# Patient Record
Sex: MALE | URBAN - METROPOLITAN AREA
[De-identification: names, ages, dates, MRNs, and addresses within clinical notes are randomized per-mention and may not be internally consistent; named-entity substitution may affect disease eponyms.]

---

## 2020-03-24 ENCOUNTER — TELEPHONE (OUTPATIENT)
Dept: DERMATOLOGY | Facility: CLINIC | Age: 25
End: 2020-03-24

## 2020-03-24 NOTE — TELEPHONE ENCOUNTER
Called and spoke with patient. He does not have any areas of concern right now. He stated he recently moved to the area and was looking for a general skin check. Reschedule for the end of June.

## 2020-06-24 ENCOUNTER — TELEPHONE (OUTPATIENT)
Dept: DERMATOLOGY | Facility: CLINIC | Age: 25
End: 2020-06-24

## 2020-06-24 NOTE — TELEPHONE ENCOUNTER
Left voicemail if patients wants to reschedule upcoming visit,or  be seen in person, or do a virtual visit. Clinic number provided for call back.    Bibi Colon LPN

## 2020-06-30 ENCOUNTER — OFFICE VISIT (OUTPATIENT)
Dept: DERMATOLOGY | Facility: CLINIC | Age: 25
End: 2020-06-30
Payer: COMMERCIAL

## 2020-06-30 DIAGNOSIS — L70.0 ACNE VULGARIS: Primary | ICD-10-CM

## 2020-06-30 DIAGNOSIS — D22.9 MULTIPLE BENIGN NEVI: ICD-10-CM

## 2020-06-30 RX ORDER — TRETINOIN 0.25 MG/G
CREAM TOPICAL
Qty: 45 G | Refills: 11 | Status: SHIPPED | OUTPATIENT
Start: 2020-06-30

## 2020-06-30 RX ORDER — BENZOYL PEROXIDE 60 MG/1
CLOTH TOPICAL
Qty: 60 EACH | Refills: 11 | Status: SHIPPED | OUTPATIENT
Start: 2020-06-30

## 2020-06-30 RX ORDER — BENZOYL PEROXIDE 60 MG/1
CLOTH TOPICAL 2 TIMES DAILY
COMMUNITY
Start: 2020-02-24 | End: 2020-06-30

## 2020-06-30 RX ORDER — CLINDAMYCIN PHOSPHATE 10 UG/ML
LOTION TOPICAL DAILY
COMMUNITY
Start: 2020-06-20

## 2020-06-30 ASSESSMENT — PAIN SCALES - GENERAL: PAINLEVEL: NO PAIN (0)

## 2020-06-30 NOTE — LETTER
Date:July 1, 2020      Patient was self referred, no letter generated. Do not send.        [facility] Information

## 2020-06-30 NOTE — LETTER
"6/30/2020       RE: Vic Yoon  [address] [place] [id number]     Dear Colleague,    Thank you for referring your patient, Vic Yoon, to the [facility] DERMATOLOGY at [facility]. Please see a copy of my visit note below.    [facility] Dermatology Note      Dermatology Problem List:  1.Numerous benign nevi  2. Acne vulgaris    Encounter Date: Jun 30, 2020    CC:   Chief Complaint   Patient presents with     Derm Problem     Vic is here today for a skin check. He states \" my acne is doing ok and I have bump under my ear that concerns me\"         History of Present Illness:  Mr. Vic Yoon is a 25 year old male who presents for evaluation of skin check.   Patient states he has had a mole on head and back biopsied in the past. Says he has lots of moles that he has been followed closely for by dermatologist back in [place]. No personal or family hx of MM or other skin cancers. Has had some bad sun burns in past though no tanning bed use. No changing moles he is aware of.  Patient has history of acne for which he has been using clinda lotion and BPO wipes. He has previously used oral antibiotics, though ran out in April. He feels ABx help, but feels things are alright without ABx as well. Does not think he has ever used retinoids in the past.  Pt otherwise feels well without any changes in general state of health. Denies any new, growing, changing, bleeding, or otherwise concerning/symptomatic skin findings. No other questions or concerns today.       Past Medical History:   There is no problem list on file for this patient.    No past medical history on file.  No past surgical history on file.    Social History:  Patient reports that he has never smoked. He has never used smokeless tobacco.    Family History:  No family history on file.    Medications:  Current Outpatient Medications   Medication Sig Dispense Refill     BENZEPRO FOAMING CLOTHS 6 % MISC 2 times daily  "      clindamycin (CLEOCIN T) 1 % external lotion daily          No Known Allergies      Review of Systems:  -As per HPI  -Constitutional: Otherwise feeling well today, in usual state of health.  -HEENT: Patient denies nonhealing oral sores.  -Skin: As above in HPI. No additional skin concerns.    Physical exam:  Vitals: There were no vitals taken for this visit.  GEN: This is a well developed, well-nourished male in no acute distress, in a pleasant mood.    SKIN: Total skin excluding the undergarment areas but including the buttocks was performed. The exam included the head/face, neck, both arms, chest, back, abdomen, both legs, digits and/or nails.   -Hernandez skin type: II  -There are superifical acneiform papules with intermixed open and closed comedones on the chin primarily, few scattered areas elsewhere on face.  -Innumerable light to dark brown homogenous, regular appearing macules distributed throughout body - including face, chest, abd, back, extremities; none with concerning findings on dermoscopy and all fit same pattern - no outliers; right upper shoulder with slightly more asymmetric lesion that should be monitored   -No other lesions of concern on areas examined.     Impression/Plan:    1. Multiple clinically benign nevi on the face, trunk, extremities- probably many with slight atypia  All generally fit signature pattern without any concerning findings on dermoscopy.No outliers; right upper shoulder with slightly more asymmetric lesion that should be monitored. No family hx MM. No hx of tanning bed use. Should have regular skin checks based on number of nevi  - Pictures taken today in clinic  - q6 month skin check  - sun protection, sun avoidance encouraged    2. Acne vulgaris, mild-moderate comedonal  - Continue BPO wipes and clinda lotion  - Begin tretinoin 0.025% cream nightly      CC Referred Self, MD  No address on file on close of this encounter.  Follow-up in 6 months, earlier for new or  changing lesions.       Dr. Crump staffed the patient.    Staff Involved:  Resident(Donato Mariee MD)/Staff  I, Sania Crump MD, saw this patient with the resident and agree with the resident s findings and plan of care as documented in the resident s note.    Again, thank you for allowing me to participate in the care of your patient.      Sincerely,    Sania Crump MD

## 2020-06-30 NOTE — PROGRESS NOTES
"[facility] Dermatology Note      Dermatology Problem List:  1.Numerous benign nevi  2. Acne vulgaris    Encounter Date: Jun 30, 2020    CC:   Chief Complaint   Patient presents with     Derm Problem     Vic is here today for a skin check. He states \" my acne is doing ok and I have bump under my ear that concerns me\"         History of Present Illness:  Mr. Vic Yoon is a 25 year old male who presents for evaluation of skin check.   Patient states he has had a mole on head and back biopsied in the past. Says he has lots of moles that he has been followed closely for by dermatologist back in [place]. No personal or family hx of MM or other skin cancers. Has had some bad sun burns in past though no tanning bed use. No changing moles he is aware of.  Patient has history of acne for which he has been using clinda lotion and BPO wipes. He has previously used oral antibiotics, though ran out in April. He feels ABx help, but feels things are alright without ABx as well. Does not think he has ever used retinoids in the past.  Pt otherwise feels well without any changes in general state of health. Denies any new, growing, changing, bleeding, or otherwise concerning/symptomatic skin findings. No other questions or concerns today.       Past Medical History:   There is no problem list on file for this patient.    No past medical history on file.  No past surgical history on file.    Social History:  Patient reports that he has never smoked. He has never used smokeless tobacco.    Family History:  No family history on file.    Medications:  Current Outpatient Medications   Medication Sig Dispense Refill     BENZEPRO FOAMING CLOTHS 6 % MISC 2 times daily       clindamycin (CLEOCIN T) 1 % external lotion daily          No Known Allergies      Review of Systems:  -As per HPI  -Constitutional: Otherwise feeling well today, in usual state of health.  -HEENT: Patient denies nonhealing oral sores.  -Skin: As above in " HPI. No additional skin concerns.    Physical exam:  Vitals: There were no vitals taken for this visit.  GEN: This is a well developed, well-nourished male in no acute distress, in a pleasant mood.    SKIN: Total skin excluding the undergarment areas but including the buttocks was performed. The exam included the head/face, neck, both arms, chest, back, abdomen, both legs, digits and/or nails.   -Hernandez skin type: II  -There are superifical acneiform papules with intermixed open and closed comedones on the chin primarily, few scattered areas elsewhere on face.  -Innumerable light to dark brown homogenous, regular appearing macules distributed throughout body - including face, chest, abd, back, extremities; none with concerning findings on dermoscopy and all fit same pattern - no outliers; right upper shoulder with slightly more asymmetric lesion that should be monitored   -No other lesions of concern on areas examined.     Impression/Plan:    1. Multiple clinically benign nevi on the face, trunk, extremities- probably many with slight atypia  All generally fit signature pattern without any concerning findings on dermoscopy.No outliers; right upper shoulder with slightly more asymmetric lesion that should be monitored. No family hx MM. No hx of tanning bed use. Should have regular skin checks based on number of nevi  - Pictures taken today in clinic  - q6 month skin check  - sun protection, sun avoidance encouraged    2. Acne vulgaris, mild-moderate comedonal  - Continue BPO wipes and clinda lotion  - Begin tretinoin 0.025% cream nightly      CC Referred Self, MD  No address on file on close of this encounter.  Follow-up in 6 months, earlier for new or changing lesions.       Dr. Crump staffed the patient.    Staff Involved:  Resident(Donato Mariee MD)/Staff  I, Sania Crump MD, saw this patient with the resident and agree with the resident s findings and plan of care as documented in the  resident s note.

## 2020-06-30 NOTE — NURSING NOTE
"Chief Complaint   Patient presents with     Derm Problem     Vic is here today for a skin check. He states \" my acne is doing ok and I have bump under my ear that concerns me\"     Yasmeen Meza, RMA  "

## 2020-09-16 ENCOUNTER — MYC REFILL (OUTPATIENT)
Dept: DERMATOLOGY | Facility: CLINIC | Age: 25
End: 2020-09-16

## 2020-09-16 DIAGNOSIS — L70.0 ACNE VULGARIS: ICD-10-CM

## 2020-09-16 RX ORDER — BENZOYL PEROXIDE 60 MG/1
CLOTH TOPICAL
Qty: 60 EACH | Refills: 11 | Status: CANCELLED | OUTPATIENT
Start: 2020-09-16

## 2021-01-04 ENCOUNTER — HEALTH MAINTENANCE LETTER (OUTPATIENT)
Age: 26
End: 2021-01-04

## 2021-10-10 ENCOUNTER — HEALTH MAINTENANCE LETTER (OUTPATIENT)
Age: 26
End: 2021-10-10

## 2022-01-30 ENCOUNTER — HEALTH MAINTENANCE LETTER (OUTPATIENT)
Age: 27
End: 2022-01-30

## 2022-09-18 ENCOUNTER — HEALTH MAINTENANCE LETTER (OUTPATIENT)
Age: 27
End: 2022-09-18

## 2023-05-08 ENCOUNTER — HEALTH MAINTENANCE LETTER (OUTPATIENT)
Age: 28
End: 2023-05-08